# Patient Record
Sex: MALE | Race: WHITE
[De-identification: names, ages, dates, MRNs, and addresses within clinical notes are randomized per-mention and may not be internally consistent; named-entity substitution may affect disease eponyms.]

---

## 2019-04-21 ENCOUNTER — HOSPITAL ENCOUNTER (EMERGENCY)
Dept: HOSPITAL 59 - ER | Age: 34
Discharge: HOME | End: 2019-04-21
Payer: SELF-PAY

## 2019-04-21 DIAGNOSIS — S60.221A: Primary | ICD-10-CM
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
## 2019-04-21 PROCEDURE — 99283 EMERGENCY DEPT VISIT LOW MDM: CPT

## 2019-04-21 NOTE — EMERGENCY DEPARTMENT RECORD
History of Present Illness





- General


Chief complaint: Extremity Problem


Stated complaint: R HAND INJURY


Time Seen by Provider: 04/21/19 12:14


Source: Patient


Mode of Arrival: Ambulatory


Limitations: No limitations





- History of Present Illness


Initial comments: 





34 yo male presents with right hand pain.  He states Saturday early morning at 

1am he was drinking and punched a wall.  He has pain in the middle and ring 

fingers.  He has a small abrasion.  No warmth, redness, or drainage.  He is 

right handed but uses both hands for work.  Tetanus is up to date less than 5 

years.  No other injuries.  


MD Complaint: Extremity pain, Joint pain


-: Days(s) (1)


Location: Left, Hand


History of Same: No


-: Yes Arthralgia


Radiation: Distal


Quality: Aching


Consistency: Constant


Improves with: Immobilization


Worsens with: Palpation, Weight bearing


Associated Symptoms: Denies other symptoms





- Related Data


 Home Medications











 Medication  Instructions  Recorded  Confirmed  Last Taken


 


No Home Med [NO HOME MEDS]  04/21/19 04/21/19 Unknown











 Allergies











Allergy/AdvReac Type Severity Reaction Status Date / Time


 


No Known Drug Allergies Allergy   Verified 04/21/19 12:22














Review of Systems


Constitutional: Denies: Chills, Fever, Malaise, Weakness


Eyes: Denies: Eye discharge


ENT: Denies: Congestion, Throat pain


Respiratory: Denies: Cough


Cardiovascular: Denies: Chest pain, Syncope


Endocrine: Denies: Fatigue


Gastrointestinal: Denies: Abdominal pain, Diarrhea, Nausea, Vomiting


Genitourinary: Denies: Dysuria, Frequency


Musculoskeletal: Reports: Arthralgia


Skin: Denies: Bruising, Change in color, Rash


Neurological: Denies: Headache


Psychiatric: Denies: Anxiety


Hematological/Lymphatic: Denies: Easy bleeding, Easy bruising





Physical Exam





- General


General Appearance: Alert, Oriented x3, Cooperative, No acute distress


Limitations: No limitations





- Head


Head exam: Atraumatic, Normocephalic, Normal inspection





- Eye


Eye exam: Normal appearance





- ENT


ENT exam: Normal exam


Ear exam: Normal external inspection


Nasal Exam: Normal inspection


Mouth exam: Normal external inspection





- Neck


Neck exam: Normal inspection





- Cardiovascular


Peripheral Pulses: 2+: Radial (R)





- Rectal


Rectal exam: Deferred





- 


 exam: Deferred





- Extremities


Extremities exam: Joint swelling, Normal capillary refill, Tenderness.  negative

: Normal inspection


Image of Hand: 


  __________________________














  __________________________





 1 - mild swelling with tenderness, no deformity, no warmth, no redness, intact 

brisk capillary refill.





 2 - very superfical abrasion.  clean otherwise.  the MCP joint is minimally 

tender








- Neurological


Neurological exam: Alert, Oriented X3





- Psychiatric


Psychiatric exam: Normal affect, Normal mood.  negative: Agitated, Anxious





- Skin


Skin exam: Abrasion, Dry, Normal color, Warm.  negative: Erythema


Type of lesion: abrasion.  negative: Laceration





Course





- Reevaluation(s)


Reevaluation #1: 


The XRs were reviewed.  No definite fracture.  Questionable tiny irregularities 

of the index through middle at the volar base


He was splinted for protection and support


We discussed a week follow up to recheck pain and ROM


If any persistence we discussed follow up


04/21/19 13:30








Disposition


Disposition: Discharge


Clinical Impression: 


 Contusion of finger of right hand





Disposition: Home, Self-Care


Condition: (1) Good


Instructions:  Hand Sprain (ED)


Additional Instructions: 


Use the splint for support and comfort


Call your doctor to recheck the area in one week to evaluate for any injuries 

that did not appear on the X-ray


If the pain continues you may additional tests or referrals


No use of the right hand at work this week


Forms:  Patient Portal Access


Time of Disposition: 13:31





Quality





- Quality Measures


Quality Measures: N/A





- Blood Pressure Screening


Does Patient Have Any of the Following: No


Blood Pressure Classification: Hypertensive Reading


Systolic Measurement: 159


Diastolic Measurement: 126


Screening for High Blood Pressure: < Pre-Hypertensive BP, F/U Documented > [

]


Pre-Hypertensive Follow-up Interventions: Referral to alternative/primary care 

provider.

## 2019-04-24 NOTE — RADIOLOGY REPORT
EXAM:  RIGHT HAND



HISTORY:  PUNCHED WALL YESTERDAY NOW WITH PAIN NEAR THIRD AND FOURTH MCP 
JOINTS. 



TECHNIQUE:  Three views of the right hand were obtained.  



Comparison:  None.  



FINDINGS:  There is focal irregularity along the volar aspect of the index 
finger middle phalanx base near the proximal interphalangeal joint. 



Otherwise, no acute fracture is seen.  No dislocation.  



IMPRESSION:  

1.  SUBTLE IRREGULARITY ALONG THE VOLAR ASPECTS OF THE INDEX FINGER MIDDLE 
PHALANX NEAR THE PIP JOINT; THE APPEARANCE COULD REPRESENT A NONDISPLACED VOLAR 
PLATE AVULSION.  RECOMMEND CORRELATION WITH PATIENT'S SYMPTOMS.  



2.  OTHERWISE, NO ACUTE OSSEOUS FINDINGS.  



JOB NUMBER:  [id number]
MTDD